# Patient Record
Sex: MALE | Race: WHITE | NOT HISPANIC OR LATINO
[De-identification: names, ages, dates, MRNs, and addresses within clinical notes are randomized per-mention and may not be internally consistent; named-entity substitution may affect disease eponyms.]

---

## 2024-03-20 PROBLEM — Z00.00 ENCOUNTER FOR PREVENTIVE HEALTH EXAMINATION: Status: ACTIVE | Noted: 2024-03-20

## 2024-03-21 ENCOUNTER — APPOINTMENT (OUTPATIENT)
Dept: NEUROLOGY | Facility: CLINIC | Age: 54
End: 2024-03-21
Payer: COMMERCIAL

## 2024-03-21 VITALS
WEIGHT: 220 LBS | SYSTOLIC BLOOD PRESSURE: 164 MMHG | DIASTOLIC BLOOD PRESSURE: 89 MMHG | OXYGEN SATURATION: 97 % | HEIGHT: 72 IN | HEART RATE: 70 BPM | BODY MASS INDEX: 29.8 KG/M2

## 2024-03-21 DIAGNOSIS — Z82.0 FAMILY HISTORY OF EPILEPSY AND OTHER DISEASES OF THE NERVOUS SYSTEM: ICD-10-CM

## 2024-03-21 DIAGNOSIS — M62.838 OTHER MUSCLE SPASM: ICD-10-CM

## 2024-03-21 DIAGNOSIS — Z78.9 OTHER SPECIFIED HEALTH STATUS: ICD-10-CM

## 2024-03-21 DIAGNOSIS — Z87.19 PERSONAL HISTORY OF OTHER DISEASES OF THE DIGESTIVE SYSTEM: ICD-10-CM

## 2024-03-21 PROCEDURE — 99205 OFFICE O/P NEW HI 60 MIN: CPT

## 2024-03-21 RX ORDER — MELOXICAM 15 MG/1
15 TABLET ORAL
Refills: 0 | Status: ACTIVE | COMMUNITY

## 2024-03-21 RX ORDER — GABAPENTIN 300 MG/1
300 CAPSULE ORAL
Refills: 0 | Status: ACTIVE | COMMUNITY

## 2024-03-21 RX ORDER — ESCITALOPRAM OXALATE 10 MG/1
10 TABLET, FILM COATED ORAL
Refills: 0 | Status: ACTIVE | COMMUNITY

## 2024-03-21 RX ORDER — OMEPRAZOLE 20 MG/1
20 CAPSULE, DELAYED RELEASE ORAL
Refills: 0 | Status: ACTIVE | COMMUNITY

## 2024-03-21 RX ORDER — TIZANIDINE 4 MG/1
4 TABLET ORAL TWICE DAILY
Qty: 30 | Refills: 2 | Status: ACTIVE | COMMUNITY
Start: 2024-03-21 | End: 1900-01-01

## 2024-03-21 RX ORDER — BUPROPION HYDROCHLORIDE 150 MG/1
150 TABLET, FILM COATED ORAL
Refills: 0 | Status: ACTIVE | COMMUNITY

## 2024-03-25 PROBLEM — Z82.0 FAMILY HISTORY OF PARKINSON'S DISEASE: Status: ACTIVE | Noted: 2024-03-21

## 2024-03-25 PROBLEM — Z78.9 NON-SMOKER: Status: ACTIVE | Noted: 2024-03-21

## 2024-03-25 PROBLEM — Z87.19 HISTORY OF GASTROESOPHAGEAL REFLUX (GERD): Status: RESOLVED | Noted: 2024-03-21 | Resolved: 2024-03-25

## 2024-03-25 RX ORDER — CYCLOBENZAPRINE HYDROCHLORIDE 7.5 MG/1
TABLET, FILM COATED ORAL
Refills: 0 | Status: DISCONTINUED | COMMUNITY
End: 2024-03-25

## 2024-03-25 NOTE — ASSESSMENT
[FreeTextEntry1] : Acute cervical radiculopathy (C7-8) based on imaging and exam. s/p 2 rounds of steroids. Doing some PT.  Due to persistent motor weakness, I will arrange for urgent neurosurgical evaluation.  Disc given back to patient   Continue Meloxicam. Continue Gabapentin 600 mg qhs. Stop Cyclobenzaprine.  Begin Tizandine 4 mg po bid prn.   Avoid any neck adjustments.

## 2024-03-25 NOTE — HISTORY OF PRESENT ILLNESS
[FreeTextEntry1] : This is a 53 y/o RH man who is being seen in neurologic consultation for neck pain and arm weakness. Symptoms started 3 weeks ago. Woke up with a "kink" in the neck. Withing a couple of days, neck pain worsened and traveled down the right arm. He noted weakness in his right hand particularly digits 4, 5. Has been treated with steroids twice which improves pain but not weakness. Not sleeping well and was placed on Gabapentin 600 mg qhs which helps.   Pain improves with movement of neck towards the left  No bowel or bladder dysfunction. Notes balance is off.  Saw Dr. Prasanna Villalpando and was given meloxicam and steroids along with flexeril. Saw chiropractor and notes some relief. Has not had any adjustments.

## 2024-03-25 NOTE — DATA REVIEWED
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 Can be seen in DISH. C6-7 right foraminal protrusion which impinges the right C7 nerve root. borderline central stenosis. C7-T1, bulge with bilateral foraminal components with foraminal narrowing which may impinge C8 roots.

## 2024-03-25 NOTE — PHYSICAL EXAM
[FreeTextEntry1] : Physical examination  General: No acute distress, Awake, Alert.   Mental status  Awake, alert, and oriented to person, time and place, Normal attention span and concentration, Recent and remote memory intact, Language intact, Fund of knowledge intact.  Cranial Nerves  II: VFF  III, IV, VI: PERRL, EOMI.  V: Facial sensation is normal B/L.  VII: Facial strength is normal B/L.  VIII: Gross hearing is intact.  IX, X: Palate is midline and elevates symmetrically.  XI: Trapezius normal strength.  XII: Tongue midline without atrophy or fasciculations.   Motor exam  Muscle tone - no evidence of rigidity or resistance in all 4 extremities.  No atrophy or fasciculations  Muscle Strength: arms and legs, proximal and distal flexors and extensors are normal EXCEPT FOR right hand extensor weakness particularly digit 4, 5. Reduced  strength in right hand.   No UE drift.  Reflexes  All present, normal, and symmetrical.  Plantars right: mute.  Plantars left: mute.    Coordination  Finger to nose: Normal.  Heel to shin: Normal.   Sensory  dec pp over the right digits 4, 5 and and up the arm (inconsistent)  Gait  Normal  Other  trapezius and cervical paraspinous muscle spasm.

## 2024-03-26 ENCOUNTER — APPOINTMENT (OUTPATIENT)
Dept: NEUROSURGERY | Facility: CLINIC | Age: 54
End: 2024-03-26
Payer: COMMERCIAL

## 2024-03-26 VITALS
WEIGHT: 220 LBS | OXYGEN SATURATION: 95 % | BODY MASS INDEX: 29.8 KG/M2 | SYSTOLIC BLOOD PRESSURE: 158 MMHG | RESPIRATION RATE: 18 BRPM | HEART RATE: 72 BPM | DIASTOLIC BLOOD PRESSURE: 96 MMHG | HEIGHT: 72 IN

## 2024-03-26 DIAGNOSIS — M54.12 RADICULOPATHY, CERVICAL REGION: ICD-10-CM

## 2024-03-26 DIAGNOSIS — M47.22 OTHER SPONDYLOSIS WITH RADICULOPATHY, CERVICAL REGION: ICD-10-CM

## 2024-03-26 DIAGNOSIS — R29.898 OTHER SYMPTOMS AND SIGNS INVOLVING THE MUSCULOSKELETAL SYSTEM: ICD-10-CM

## 2024-03-26 PROCEDURE — 99205 OFFICE O/P NEW HI 60 MIN: CPT

## 2024-03-28 PROBLEM — R29.898 RIGHT HAND WEAKNESS: Status: ACTIVE | Noted: 2024-03-28

## 2024-03-28 PROBLEM — M47.22 CERVICAL SPONDYLOSIS WITH RADICULOPATHY: Status: ACTIVE | Noted: 2024-03-28

## 2024-03-28 PROBLEM — M54.12 CERVICAL RADICULOPATHY: Status: ACTIVE | Noted: 2024-03-21

## 2024-03-28 NOTE — HISTORY OF PRESENT ILLNESS
[de-identified] : LUCRECIA LOCKE is a 54 year male with a PMH of GERD, lumbar microdiscectomy, who presents to the office today for neurosurgical consultation at the request of Dr. Slaughter due to cervical radiculopathy and right hand weakness.   The pain is characterized as electrical, sharp, tingling constant in nature.  It started four weeks ago started with tingling and then the pain progressed down the right arm. It is exacerbated by worse with neck extension and relieved by flexing forward.  It radiates from neck pain into his right arm. There has been no known trauma precipitating the pain.  He woke up with a "kink" in his neck and a few days later had neck pain that radiated down his right arm with weakness in his right hand fourth and fifth digit.  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  The patient endorses numbness of fourth and fifth right hand digits. Endorses weakness of extremities. Denies bowel or bladder incontinence and gait disturbance.  He has tried chiropractic manipulations x 3, traction with chiropractor,, PT exercises, and pain medications including Meloxicam, medrol, Cyclobenzaprine, Tizanidine, Gabapentin 600 mg in the last month without relief.  He has undergone imaging in the form of MRI cervical spine (see detailed report below)  Surg Hx: left total hip replacement, lumbar L4-L5 laminectomy  Meds:  Cyclobenzaprine, Gabapentin, Lexapro, Meloxicam (no longer taking), Omeprazole, Tizanidine 4 mg BID PRN (helping), Wellbutrin q 12, Lexapro 10 mg Prednisone Allergies: NKDA  Soc Hx: nonsmoker former smoker quit in 1993 4-5 cigarettes per day for 6 months, no EtOH, lives with family, works as a writer and director. Plays tennis and has not been able to participate in activities. 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

## 2024-03-28 NOTE — PHYSICAL EXAM
[General Appearance - Alert] : alert [General Appearance - In No Acute Distress] : in no acute distress [Fluency] : fluency intact [Comprehension] : comprehension intact [Cranial Nerves Optic (II)] : visual acuity intact bilaterally,  pupils equal round and reactive to light [Cranial Nerves Oculomotor (III)] : extraocular motion intact [Cranial Nerves Trigeminal (V)] : facial sensation intact symmetrically [Cranial Nerves Facial (VII)] : face symmetrical [Cranial Nerves Vestibulocochlear (VIII)] : hearing was intact bilaterally [Cranial Nerves Glossopharyngeal (IX)] : tongue and palate midline [Cranial Nerves Accessory (XI - Cranial And Spinal)] : head turning and shoulder shrug symmetric [Cranial Nerves Hypoglossal (XII)] : there was no tongue deviation with protrusion [Motor Strength] : muscle strength was normal in all four extremities [Sensation Tactile Decrease] : light touch was intact [Abnormal Walk] : normal gait [2+] : Ankle jerk left 2+ [Spurling's Same Side] : Positive Spurling's on same side [FreeTextEntry6] : Right FF 4 Right FA 4  [Spurling's - Opposite Side] : Negative Spurling's on opposite side

## 2024-03-28 NOTE — ASSESSMENT
[FreeTextEntry1] : LUCRECIA LOCKE is a 54 year male with a PMH of GERD, lumbar discectomy, presenting due to cervical radiculopathy and right hand weakness.  He has been suffering from neck pain that radiates into his right arm with a positive ipsilateral Spurling sign.  For the last 3 weeks however he has noticed that the pain is worse and he has developed hand weakness particularly in his fourth and fifth digits that have not improved with steroids or gabapentin.  I personally viewed the patient's MRI with him in the office today with the use of a spine model to aid in interpretation.  I demonstrated that there is overall straightening of the cervical lordosis as well as significant anterior osteophyte formation.  Overall there is multilevel cervical spondylosis due to degenerative disc disease and facet arthropathy with ligamentous hypertrophy.  At C5-6 there is severe left-sided neuroforaminal stenosis without significant right-sided foraminal or central stenosis.  At C6-7 there is significant neuroforaminal stenosis bilaterally as well as mild to moderate central stenosis.  At C7-T1 there is bilateral foraminal stenosis seen.   I have discussed the natural history and treatment options for cervical myelopathy with the patient. I explained the indications for observation, conservative management, medical management, physical therapy, pain management approaches and surgery. I explained the different types and surgical approaches including anterior and posterior approaches as well as decompression only procedures and instrumented fusions.   In the end, I recommend additional imaging in the form of CT cervical spine to better visualize the extent of osteophyte complex development and facet arthropathy, as well as upright cervical spine X-rays with flexion and extension views to demonstrate the regional spinal alignment and to rule out any visible dynamic instability. The patient may return to the office once imaging completed for further treatment planning. He was also advised to reach out to us sooner if he is to develop weakness or bowel/bladder symptoms.  I spent 60 minutes relative to this patient encounter.

## 2024-03-28 NOTE — CONSULT LETTER
[Dear  ___] : Dear  [unfilled], [Consult Letter:] : I had the pleasure of evaluating your patient, [unfilled]. [Please see my note below.] : Please see my note below. [Consult Closing:] : Thank you very much for allowing me to participate in the care of this patient.  If you have any questions, please do not hesitate to contact me. [Sincerely,] : Sincerely, [FreeTextEntry3] : Alban Carrasquillo M.D.